# Patient Record
(demographics unavailable — no encounter records)

---

## 2024-11-25 NOTE — DISCUSSION/SUMMARY
[FreeTextEntry1] : 62 yo M sees Dr. Valentino for which I see for PAD. For historical reference, he has PAD with interventions (RLE 10/2023, LLE 7/2023), obesity, tobacco use, CAD 6/2023 bypass with recurrent pleural effusions  # USCG Criteria Cardiology Comments: His functional status is preserved METS>4 without cardiovascular symptoms. Attached cardiology consultation from today, echocardiogram from 8/2024, coronary angiogram report from 6/2023, discharge note from [facility] after bypass 6/26/23, labwork from 9/16/24. **We will schedule a myocardial perfusion test as per requirements. **  #CHRONIC:  He feels great from a PAD standpoint without claudication. He underwent complex b/l REGINA and bilateral SFA intervention 7/2023 and 10/2023 with subsequent improvement in AYE and symptoms. Has residual disease best managed medically. I will watch aye/duplex closely unfortunately RSFA stent is occluded. 1 vessel runoff b/l peroneal dominant.  Cardiac rehab. Maintain DAPT. He has recurrent left bloody pleural effusions post CABG requiring thoracentesis. Will not do aspirin/xarelto 2.5 mg bid for this reason. Tobacco cessation. Seeing pulmonary.  See me next month as scheduled with AYE/duplex. Seeing Dr. Valentino regularly. ER precautions given to patient.

## 2024-11-25 NOTE — HISTORY OF PRESENT ILLNESS
[FreeTextEntry1] : 62 yo M sees Dr. Valentino for which I see for PAD. For historical reference, he has PAD with interventions (RLE 10/2023, LLE 7/2023), obesity, tobacco use, CAD 6/2023 bypass with recurrent pleural effusions  11/2024 VISIT: feels well. here for [employer] license evaluation. no angina. no claudication.   8/2024 VISIT: feels well from PAD standpoint. vascular surveillance testing with preserved LLE findings however occluded RSFA stent. R AYE 0.62, L 0.89. bp elevated. +weight loss  6/2024 VISIT: feels great from PAD standpoint. warm. triphasic PTs. monophasic DPs. 1 ppd.   1/2024 VISIT: feels great from a PAD standpoint. walking without symptoms he does have + wheezing. warm extremities, no wounds/ulcers. 1/4 ppd.   11/2023 VISIT:  claudication resolved. 1.15L left pleural effusion drained reports bloody. lowering tobacco 1/4 ppd. Right AYE 0.68. L AYE 0.71. intermittently gets gout flare. walking great now. right foot numbness unchanged.   8/23/23 VISIT: AYE increased tremendously on left to 0.96. Right stable 0.59 residual disease. still small tobacco use. right cramp claudication. left leg feels amazing.   8/2/2023 visit: he feels tremendous after PAD revasc. able to walk without limitation [place] [employer] today.  Interim he underwent coronary bypass surgery and had elective peripheral vascular intervention with bilateral common iliac and left SFA interventions.  Presented back with mild reperfusion edema which has improved with conservative therapy.  No DVT.  duplex showed patent stent.  2+ distal LLE. 1-2+ cfas mild edema  5/2023 initial visit: Has bilateral R>L lower extremity calf claudication describes as burning worsening over past year kobe IIb. difficult to walk from car even. better after 2 minutes. lifestyle limiting. 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 1+ b/l CFA pulses. With his chronic dyspnea on exertion patient was also sent for comprehensive cardiac evaluation.  Echo as detailed.  Pending coronary CTA.  Obesity is contributing as well Right AYE 0.57.  Left AYE 0.65.  Duplex with inflow disease bilaterally and severe focal left SFA disease with calcified three-vessel runoff. No AAA Mild carotid disease Echo with preserved EF. .  A1c 5.9 mildly high TSH.

## 2024-11-25 NOTE — DISCUSSION/SUMMARY
[FreeTextEntry1] : 62 yo M sees Dr. Valentino for which I see for PAD. For historical reference, he has PAD with interventions (RLE 10/2023, LLE 7/2023), obesity, tobacco use, CAD 6/2023 bypass with recurrent pleural effusions  # USCG Criteria Cardiology Comments: His functional status is preserved METS>4 without cardiovascular symptoms. Attached cardiology consultation from today, echocardiogram from 8/2024, coronary angiogram report from 6/2023, discharge note from [facility] after bypass 6/26/23, labwork from 9/16/24. **We will schedule a myocardial perfusion test as per requirements. **  #CHRONIC:  He feels great from a PAD standpoint without claudication. He underwent complex b/l REGINA and bilateral SFA intervention 7/2023 and 10/2023 with subsequent improvement in AYE and symptoms. Has residual disease best managed medically. I will watch aye/duplex closely unfortunately RSFA stent is occluded. 1 vessel runoff b/l peroneal dominant.  Cardiac rehab. Maintain DAPT. He has recurrent left bloody pleural effusions post CABG requiring thoracentesis. Will not do aspirin/xarelto 2.5 mg bid for this reason. Tobacco cessation. Seeing pulmonary.  See me next month as scheduled with AYE/duplex. Seeing Dr. Valentino regularly. ER precautions given to patient.

## 2024-11-25 NOTE — HISTORY OF PRESENT ILLNESS
[FreeTextEntry1] : 64 yo M sees Dr. Valentino for which I see for PAD. For historical reference, he has PAD with interventions (RLE 10/2023, LLE 7/2023), obesity, tobacco use, CAD 6/2023 bypass with recurrent pleural effusions  11/2024 VISIT: feels well. here for [employer] evaluation. no angina. no claudication.   8/2024 VISIT: feels well from PAD standpoint. vascular surveillance testing with preserved LLE findings however occluded RSFA stent. R AYE 0.62, L 0.89. bp elevated. +weight loss  6/2024 VISIT: feels great from PAD standpoint. warm. triphasic PTs. monophasic DPs. 1 ppd.   1/2024 VISIT: feels great from a PAD standpoint. walking without symptoms he does have + wheezing. warm extremities, no wounds/ulcers. 1/4 ppd.   11/2023 VISIT:  claudication resolved. 1.15L left pleural effusion drained reports bloody. lowering tobacco 1/4 ppd. Right AYE 0.68. L AYE 0.71. intermittently gets gout flare. walking great now. right foot numbness unchanged.   8/23/23 VISIT: AYE increased tremendously on left to 0.96. Right stable 0.59 residual disease. still small tobacco use. right cramp claudication. left leg feels amazing.   8/2/2023 visit: he feels tremendous after PAD revasc. able to walk without limitation [place] [employer] today.  Interim he underwent coronary bypass surgery and had elective peripheral vascular intervention with bilateral common iliac and left SFA interventions.  Presented back with mild reperfusion edema which has improved with conservative therapy.  No DVT.  duplex showed patent stent.  2+ distal LLE. 1-2+ cfas mild edema  5/2023 initial visit: Has bilateral R>L lower extremity calf claudication describes as burning worsening over past year kobe IIb. difficult to walk from car even. better after 2 minutes. lifestyle limiting. 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 1+ b/l CFA pulses. With his chronic dyspnea on exertion patient was also sent for comprehensive cardiac evaluation.  Echo as detailed.  Pending coronary CTA.  Obesity is contributing as well Right AYE 0.57.  Left AYE 0.65.  Duplex with inflow disease bilaterally and severe focal left SFA disease with calcified three-vessel runoff. No AAA Mild carotid disease Echo with preserved EF. .  A1c 5.9 mildly high TSH.

## 2025-07-22 NOTE — HISTORY OF PRESENT ILLNESS
[FreeTextEntry1] : 63 yo M sees Dr. Valentino for which I see for PAD. For historical reference, he has PAD with interventions (RLE 10/2023, LLE 7/2023), obesity, tobacco use, CAD 6/2023 bypass with recurrent pleural effusions.  7/2025 VISIT: no angina. stable dyspnea. interim appt for hypothyroidism after missing medications. no claudication. walked all around [employer] today 20 minutes.  monophasic distal pulses. 2+ CFAs. LLL pleural effusion.   12/2024 testing:  nuclear perfusion test: ef 67% apical ischemia  arterial duplex multivessel disease.  AYE R 0.55 TBI 0.25, L 0.79 TBI 0.43.   11/2024 VISIT: feels well. here for [employer] license evaluation. no angina. no claudication.   8/2024 VISIT: feels well from PAD standpoint. vascular surveillance testing with preserved LLE findings however occluded RSFA stent. R AYE 0.62, L 0.89. bp elevated. +weight loss  6/2024 VISIT: feels great from PAD standpoint. warm. triphasic PTs. monophasic DPs. 1 ppd.   1/2024 VISIT: feels great from a PAD standpoint. walking without symptoms he does have + wheezing. warm extremities, no wounds/ulcers. 1/4 ppd.   11/2023 VISIT:  claudication resolved. 1.15L left pleural effusion drained reports bloody. lowering tobacco 1/4 ppd. Right AYE 0.68. L AYE 0.71. intermittently gets gout flare. walking great now. right foot numbness unchanged.   8/23/23 VISIT: AYE increased tremendously on left to 0.96. Right stable 0.59 residual disease. still small tobacco use. right cramp claudication. left leg feels amazing.   8/2/2023 visit: he feels tremendous after PAD revasc. able to walk without limitation [place] [employer] today.  Interim he underwent coronary bypass surgery and had elective peripheral vascular intervention with bilateral common iliac and left SFA interventions.  Presented back with mild reperfusion edema which has improved with conservative therapy.  No DVT.  duplex showed patent stent.  2+ distal LLE. 1-2+ cfas mild edema  5/2023 initial visit: Has bilateral R>L lower extremity calf claudication describes as burning worsening over past year kobe IIb. difficult to walk from car even. better after 2 minutes. lifestyle limiting. 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 1+ b/l CFA pulses. With his chronic dyspnea on exertion patient was also sent for comprehensive cardiac evaluation.  Echo as detailed.  Pending coronary CTA.  Obesity is contributing as well Right AYE 0.57.  Left AYE 0.65.  Duplex with inflow disease bilaterally and severe focal left SFA disease with calcified three-vessel runoff. No AAA Mild carotid disease Echo with preserved EF. .  A1c 5.9 mildly high TSH.

## 2025-07-22 NOTE — HISTORY OF PRESENT ILLNESS
[FreeTextEntry1] : 65 yo M sees Dr. Valentino for which I see for PAD. For historical reference, he has PAD with interventions (RLE 10/2023, LLE 7/2023), obesity, tobacco use, CAD 6/2023 bypass with recurrent pleural effusions.  7/2025 VISIT: no angina. stable dyspnea. interim appt for hypothyroidism after missing medications. no claudication. walked all around [employer] today 20 minutes.  monophasic distal pulses. 2+ CFAs. LLL pleural effusion.   12/2024 testing:  nuclear perfusion test: ef 67% apical ischemia  arterial duplex multivessel disease.  AYE R 0.55 TBI 0.25, L 0.79 TBI 0.43.   11/2024 VISIT: feels well. here for [employer] license evaluation. no angina. no claudication.   8/2024 VISIT: feels well from PAD standpoint. vascular surveillance testing with preserved LLE findings however occluded RSFA stent. R AYE 0.62, L 0.89. bp elevated. +weight loss  6/2024 VISIT: feels great from PAD standpoint. warm. triphasic PTs. monophasic DPs. 1 ppd.   1/2024 VISIT: feels great from a PAD standpoint. walking without symptoms he does have + wheezing. warm extremities, no wounds/ulcers. 1/4 ppd.   11/2023 VISIT:  claudication resolved. 1.15L left pleural effusion drained reports bloody. lowering tobacco 1/4 ppd. Right AYE 0.68. L AYE 0.71. intermittently gets gout flare. walking great now. right foot numbness unchanged.   8/23/23 VISIT: AYE increased tremendously on left to 0.96. Right stable 0.59 residual disease. still small tobacco use. right cramp claudication. left leg feels amazing.   8/2/2023 visit: he feels tremendous after PAD revasc. able to walk without limitation [place] [employer] today.  Interim he underwent coronary bypass surgery and had elective peripheral vascular intervention with bilateral common iliac and left SFA interventions.  Presented back with mild reperfusion edema which has improved with conservative therapy.  No DVT.  duplex showed patent stent.  2+ distal LLE. 1-2+ cfas mild edema  5/2023 initial visit: Has bilateral R>L lower extremity calf claudication describes as burning worsening over past year kobe IIb. difficult to walk from car even. better after 2 minutes. lifestyle limiting. 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 1+ b/l CFA pulses. With his chronic dyspnea on exertion patient was also sent for comprehensive cardiac evaluation.  Echo as detailed.  Pending coronary CTA.  Obesity is contributing as well Right AYE 0.57.  Left AYE 0.65.  Duplex with inflow disease bilaterally and severe focal left SFA disease with calcified three-vessel runoff. No AAA Mild carotid disease Echo with preserved EF. .  A1c 5.9 mildly high TSH.

## 2025-07-22 NOTE — DISCUSSION/SUMMARY
[FreeTextEntry1] : 63 yo M sees Dr. Valentino for which I see for PAD. For historical reference, he has PAD with interventions (RLE 10/2023, LLE 7/2023), obesity, tobacco use, CAD 6/2023 bypass with recurrent pleural effusions  He feels great from a PAD standpoint without claudication. He underwent complex b/l REGINA and bilateral SFA intervention 7/2023 and 10/2023 with subsequent improvement in AYE and symptoms. Has residual disease best managed medically. I will watch aye/duplex closely unfortunately RSFA stent is occluded. 1 vessel runoff b/l peroneal dominant.   Maintain DAPT. He has recurrent left bloody pleural effusions post CABG requiring thoracentesis. Will not do aspirin/xarelto 2.5 mg bid for this reason.  Tobacco cessation. Seeing pulmonary.  Return after. Thereafter, set with Valentino regularly. ER precautions given to patient.